# Patient Record
Sex: FEMALE | Employment: FULL TIME | URBAN - METROPOLITAN AREA
[De-identification: names, ages, dates, MRNs, and addresses within clinical notes are randomized per-mention and may not be internally consistent; named-entity substitution may affect disease eponyms.]

---

## 2017-03-14 ENCOUNTER — HOSPITAL ENCOUNTER (OUTPATIENT)
Dept: GENERAL RADIOLOGY | Age: 27
Discharge: HOME | End: 2017-03-14
Payer: COMMERCIAL

## 2017-03-14 DIAGNOSIS — Z22.7 INACTIVE TUBERCULOSIS: ICD-10-CM

## 2017-03-14 PROCEDURE — 71020 XR CHEST PA LAT: CPT

## 2018-01-02 ENCOUNTER — OFFICE VISIT (OUTPATIENT)
Dept: ORTHOPEDIC SURGERY | Facility: CLINIC | Age: 28
End: 2018-01-02

## 2018-01-02 VITALS
WEIGHT: 170 LBS | HEIGHT: 67 IN | SYSTOLIC BLOOD PRESSURE: 121 MMHG | BODY MASS INDEX: 26.68 KG/M2 | TEMPERATURE: 97.1 F | DIASTOLIC BLOOD PRESSURE: 80 MMHG | OXYGEN SATURATION: 98 % | HEART RATE: 83 BPM

## 2018-01-02 DIAGNOSIS — G89.29 CHRONIC PAIN OF RIGHT KNEE: Primary | ICD-10-CM

## 2018-01-02 DIAGNOSIS — M25.561 CHRONIC PAIN OF RIGHT KNEE: Primary | ICD-10-CM

## 2018-01-02 DIAGNOSIS — M94.261 CHONDROMALACIA, RIGHT KNEE: ICD-10-CM

## 2018-01-02 DIAGNOSIS — M25.361 KNEE INSTABILITY, RIGHT: ICD-10-CM

## 2018-01-02 NOTE — PROGRESS NOTES
Patient: Queen Perry                MRN: [id number]       SSN: [id number]  YOB: 1990        AGE: 32 y.o. SEX: female    PCP: None  01/02/18    Chief Complaint   Patient presents with    Knee Pain     RIGHT     HISTORY:  Queen Perry is a 32 y.o. female who is seen for right knee pain. She reports pain for the past year with no history of injury. She states she believes her pain is possibly a result of overuse when participating in high school sports. She states she was involved in basketball, softball, cheerleading and gymnastics. She notes popping and cracking or her right knee. She reports she received a cortisone injection from Dr. Leif Sullivan a couple of months ago with temporary relief. She states her pain increased most significantly after delivering her son 1 year ago. She notes lateral right knee pain that radiates into her upper right leg. She states that she feels like her right knee is unstable at times. She notes difficulty when planting her right foot and turning. 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 She is Dr. Garay Ast niece by marriage- Willie Ulloa is her father in-law. She lives in [place] with her  and two children. She has two children- a 1year old daughter and 3year old son. Current weight is 170 pounds. She reports she gained 25-30 pounds after having her son 1 year ago. She is 5'7\" tall. She is not diabetic or hypertensive.      No results found for: HBA1C, HGBE8, FAP3XBNO, FGD6GZBE, ZNQ3TUVH  Weight Metrics 1/2/2018 11/20/2016 11/1/2016 10/31/2016 10/9/2016 9/22/2016 7/15/2014   Weight 170 lb 177 lb 175 lb 175 lb 169 lb 167 lb 180 lb   BMI 26.63 kg/m2 27.72 kg/m2 27.41 kg/m2 27.41 kg/m2 26.47 kg/m2 26.16 kg/m2 28.19 kg/m2       Patient Active Problem List   Diagnosis Code    Tb converter tx'ed 2011 R76.11    Migraine Dr. Lopez Rear 0     premature rupture of membranes (PPROM) with unknown onset of labor O42.919     REVIEW OF SYSTEMS: All Below are Negative except: See HPI   Constitutional: negative for fever, chills, and weight loss. Cardiovascular: negative for chest pain, claudication, leg swelling, SOB, CHAPMAN   Gastrointestinal: Negative for pain, N/V/C/D, Blood in stool or urine, dysuria,  hematuria, incontinence, pelvic pain. Musculoskeletal: See HPI   Neurological: Negative for dizziness and weakness. Negative for headaches, Visual changes, confusion, seizures   Phychiatric/Behavioral: Negative for depression, memory loss, substance  abuse. Extremities: Negative for hair changes, rash, or skin lesion changes. Hematologic: Negative for bleeding problems, bruising, pallor or swollen lymph  nodes   Peripheral Vascular: No calf pain, no circulation deficits. Social History     Social History    Marital status:      Spouse name: N/A    Number of children: N/A    Years of education: N/A     Occupational History    Not on file. Social History Main Topics    Smoking status: Former Smoker     Packs/day: 0.50    Smokeless tobacco: Never Used    Alcohol use No    Drug use: No    Sexual activity: Yes     Partners: Male     Birth control/ protection: None     Other Topics Concern    Not on file     Social History Narrative      Allergies   Allergen Reactions    Topamax [Topiramate] Other (comments)     Emotional; pt states it makes her angry      Current Outpatient Prescriptions   Medication Sig    ibuprofen (MOTRIN) 800 mg tablet Take 1 Tab by mouth every eight (8) hours as needed.     ferrous sulfate 325 mg (65 mg iron) tablet Take 1 Tab by mouth two (2) times daily (with meals).  oxyCODONE-acetaminophen (PERCOCET) 5-325 mg per tablet Take 2 Tabs by mouth every four (4) hours as needed. Max Daily Amount: 12 Tabs. No current facility-administered medications for this visit. PHYSICAL EXAMINATION:  Visit Vitals    /80    Pulse 83    Temp 97.1 °F (36.2 °C) (Oral)    Ht 5' 7\" (1.702 m)    Wt 170 lb (77.1 kg)    SpO2 98%    BMI 26.63 kg/m2      ORTHO EXAMINATION:  Examination Right knee Left knee   Skin Intact Intact   Range of motion 115-0 120-0   Effusion - -   Medial joint line tenderness + -   Lateral joint line tenderness - -   Popliteal tenderness - -   Osteophytes palpable - -   Ashleys - -   Patella crepitus ++ -   Anterior drawer - -   Lateral laxity - -   Medial laxity - -   Varus deformity - -   Valgus deformity - -   Pretibial edema - -   Calf tenderness - -   Wearing a fit bit on her right wrist   - circumduction     RADIOGRAPHS:  XR RIGHT KNEE 1/2/18  IMPRESSION:  Three views - No fractures, no effusion, no joint space narrowing, no osteophytes present. Minimal flattening and squaring of the condylar surfaces. IMPRESSION:      ICD-10-CM ICD-9-CM    1. Chronic pain of right knee M25.561 719.46 AMB POC X-RAY KNEE 3 VIEW    G89.29 338.29 MRI KNEE RT WO CONT   2. Knee instability, right M25.361 718.86 MRI KNEE RT WO CONT   3. Chondromalacia, right knee M94.261 717.7      PLAN: She will follow up in two weeks with the results of her right knee MRI. There is no need for surgery at this time. Continue weight loss efforts with a low carb diet.     Scribed by Go Brown ([address]) as dictated by David Reynolds MD

## 2018-01-02 NOTE — PATIENT INSTRUCTIONS
MRI of the Knee: About This Test  What is it? MRI (magnetic resonance imaging) is a test that uses a magnetic field and pulses of radio wave energy to make pictures of the organs and structures inside the body. An MRI can give your doctor information about your knee, the bones around it, and the tissues around it, such as cartilage, ligaments, and tendons. When you have an MRI, you lie on a table and the table moves into the MRI machine. Why is this test done? An MRI of the knee can help find problems such as damage to the ligaments and cartilage around the knee. The MRI also can look for the cause of unexplained knee pain or the knee giving out for no reason. How can you prepare for the test?  Talk to your doctor about all your health conditions before the test. For example, tell your doctor if:  · You are allergic to any medicines. · You are or might be pregnant. · You have a pacemaker, an artificial limb, any metal pins or metal parts in your body, metal heart valves, metal clips in your brain, metal implants in your ears, or any other implanted or prosthetic medical device. · You have an intrauterine device (IUD) in place. · You get nervous in confined spaces. You may need medicine to help you relax. · You wear a patch that contains medicine. · You have kidney disease. What happens before the test?  · You will remove all metal objects, such as hearing aids, dentures, jewelry, watches, and hairpins. · You will take off all or most of your clothes and change into a gown. If you do leave some clothes on, make sure you take everything out of your pockets. · You may have contrast material (dye) put into your arm through a tube called an IV. Contrast material helps doctors see specific organs, blood vessels, and most tumors. What happens during the test?  · You will lie on your back on a table that is part of the MRI scanner.  Your head, chest, and arms may be held with straps to help you remain still.  · The table will slide into the space that contains the magnet. A device called a coil may be placed over or wrapped around your knee. · Inside the scanner you will hear a fan and feel air moving. You may hear tapping, thumping, or snapping noises. You may be given earplugs or headphones to reduce the noise. · You will be asked to hold still during the scan. You may be asked to hold your breath for short periods. · You may be alone in the scanning room, but a technologist will be watching you through a window and talking with you during the test.  What else should you know about the test?  · An MRI does not hurt. · If a dye is used, you may feel a quick sting or pinch and some coolness when the IV is started. The dye may give you a metallic taste in your mouth. Some people feel sick to their stomach or get a headache. · If you breastfeed and are concerned about whether the dye used in this test is safe, talk to your doctor. Most experts believe that very little dye passes into breast milk and even less is passed on to the baby. But if you prefer, you can store some of your breast milk ahead of time and use it for a day or two after the test.  · You may feel warmth in the area being examined. This is normal.  How long does the test take? · The test usually takes 30 to 60 minutes but can take as long as 2 hours. What happens after the test?  · You will probably be able to go home right away, depending on the reason for the test.  · You can go back to your usual activities right away. Follow-up care is a key part of your treatment and safety. Be sure to make and go to all appointments, and call your doctor if you are having problems. It's also a good idea to keep a list of the medicines you take. Ask your doctor when you can expect to have your test results. Where can you learn more? Go to http://foreign-jose.info/.   Enter  in the search box to learn more about \"MRI of the Knee: About This Test.\"  Current as of: October 14, 2016  Content Version: 11.4  © 2737-7337 Healthwise, Incorporated. Care instructions adapted under license by [employer] (which disclaims liability or warranty for this information). If you have questions about a medical condition or this instruction, always ask your healthcare professional. [place] [address] any warranty or liability for your use of this information.

## 2018-01-02 NOTE — MR AVS SNAPSHOT
Visit Information Date & Time Provider Department Dept. Phone Encounter #  
 1/2/2018  3:30 PM Raquel Salazar, [address] [facility] [phone] Follow-up Instructions Return in about 2 weeks (around 1/16/2018). Upcoming Health Maintenance Date Due DTaP/Tdap/Td series (1 - Tdap) 2/28/2011 PAP AKA CERVICAL CYTOLOGY 9/5/2015 Influenza Age 5 to Adult 8/1/2017 Allergies as of 1/2/2018  Review Complete On: 1/2/2018 By: Luci Saldaña Severity Noted Reaction Type Reactions Topamax [Topiramate]  09/01/2011    Other (comments) Emotional; pt states it makes her angry Current Immunizations  Never Reviewed No immunizations on file. Not reviewed this visit You Were Diagnosed With   
  
 Codes Comments Chronic pain of right knee    -  Primary ICD-10-CM: M25.561, T31.96 ICD-9-CM: 719.46, 338.29 Knee instability, right     ICD-10-CM: M25.361 ICD-9-CM: 718.86 Vitals BP Pulse Temp Height(growth percentile) Weight(growth percentile) SpO2  
 121/80 83 97.1 °F (36.2 °C) (Oral) 5' 7\" (1.702 m) 170 lb (77.1 kg) 98% BMI OB Status Smoking Status 26.63 kg/m2 Recent pregnancy Former Smoker BMI and BSA Data Body Mass Index Body Surface Area  
 26.63 kg/m 2 1.91 m 2 Preferred Pharmacy Pharmacy Name Phone [facility] [address] [phone] Your Updated Medication List  
  
   
This list is accurate as of: 1/2/18  4:37 PM.  Always use your most recent med list.  
  
  
  
  
 ferrous sulfate 325 mg (65 mg iron) tablet Take 1 Tab by mouth two (2) times daily (with meals). ibuprofen 800 mg tablet Commonly known as:  MOTRIN Take 1 Tab by mouth every eight (8) hours as needed. oxyCODONE-acetaminophen 5-325 mg per tablet Commonly known as:  PERCOCET  
 Take 2 Tabs by mouth every four (4) hours as needed. Max Daily Amount: 12 Tabs. We Performed the Following AMB POC X-RAY KNEE 3 VIEW [07236 CPT(R)] Follow-up Instructions Return in about 2 weeks (around 1/16/2018). Patient Instructions MRI of the Knee: About This Test 
What is it? MRI (magnetic resonance imaging) is a test that uses a magnetic field and pulses of radio wave energy to make pictures of the organs and structures inside the body. An MRI can give your doctor information about your knee, the bones around it, and the tissues around it, such as cartilage, ligaments, and tendons. When you have an MRI, you lie on a table and the table moves into the MRI machine. Why is this test done? An MRI of the knee can help find problems such as damage to the ligaments and cartilage around the knee. The MRI also can look for the cause of unexplained knee pain or the knee giving out for no reason. How can you prepare for the test? 
Talk to your doctor about all your health conditions before the test. For example, tell your doctor if: 
· You are allergic to any medicines. · You are or might be pregnant. · You have a pacemaker, an artificial limb, any metal pins or metal parts in your body, metal heart valves, metal clips in your brain, metal implants in your ears, or any other implanted or prosthetic medical device. · You have an intrauterine device (IUD) in place. · You get nervous in confined spaces. You may need medicine to help you relax. · You wear a patch that contains medicine. · You have kidney disease. What happens before the test? 
· You will remove all metal objects, such as hearing aids, dentures, jewelry, watches, and hairpins. · You will take off all or most of your clothes and change into a gown. If you do leave some clothes on, make sure you take everything out of your pockets.  
· You may have contrast material (dye) put into your arm through a tube called an IV. Contrast material helps doctors see specific organs, blood vessels, and most tumors. What happens during the test? 
· You will lie on your back on a table that is part of the MRI scanner. Your head, chest, and arms may be held with straps to help you remain still. · The table will slide into the space that contains the magnet. A device called a coil may be placed over or wrapped around your knee. · Inside the scanner you will hear a fan and feel air moving. You may hear tapping, thumping, or snapping noises. You may be given earplugs or headphones to reduce the noise. · You will be asked to hold still during the scan. You may be asked to hold your breath for short periods. · You may be alone in the scanning room, but a technologist will be watching you through a window and talking with you during the test. 
What else should you know about the test? 
· An MRI does not hurt. · If a dye is used, you may feel a quick sting or pinch and some coolness when the IV is started. The dye may give you a metallic taste in your mouth. Some people feel sick to their stomach or get a headache. · If you breastfeed and are concerned about whether the dye used in this test is safe, talk to your doctor. Most experts believe that very little dye passes into breast milk and even less is passed on to the baby. But if you prefer, you can store some of your breast milk ahead of time and use it for a day or two after the test. 
· You may feel warmth in the area being examined. This is normal. 
How long does the test take? · The test usually takes 30 to 60 minutes but can take as long as 2 hours. What happens after the test? 
· You will probably be able to go home right away, depending on the reason for the test. 
· You can go back to your usual activities right away. Follow-up care is a key part of your treatment and safety.  Be sure to make and go to all appointments, and call your doctor if you are having problems. It's also a good idea to keep a list of the medicines you take. Ask your doctor when you can expect to have your test results. Where can you learn more? Go to http://foreign-jose.info/. Enter  in the search box to learn more about \"MRI of the Knee: About This Test.\" Current as of: October 14, 2016 Content Version: 11.4 © 2357-1821 [employer]. Care instructions adapted under license by [employer] (which disclaims liability or warranty for this information). If you have questions about a medical condition or this instruction, always ask your healthcare professional. [address] any warranty or liability for your use of this information. Introducing Trevon! Marilee Toribio introduces RealDeck patient portal. Now you can access parts of your medical record, email your doctor's office, and request medication refills online. 1. In your internet browser, go to [email or web address] 2. Click on the First Time User? Click Here link in the Sign In box. You will see the New Member Sign Up page. 3. Enter your RealDeck Access Code exactly as it appears below. You will not need to use this code after youve completed the sign-up process. If you do not sign up before the expiration date, you must request a new code. · RealDeck Access Code: [email or web address] Expires: 4/2/2018  4:37 PM 
 
4. Enter the last four digits of your Social Security Number (xxxx) and Date of Birth (mm/dd/yyyy) as indicated and click Submit. You will be taken to the next sign-up page. 5. Create a RealDeck ID. This will be your RealDeck login ID and cannot be changed, so think of one that is secure and easy to remember. 6. Create a RealDeck password. You can change your password at any time. 7. Enter your Password Reset Question and Answer. This can be used at a later time if you forget your password. 8. Enter your e-mail address. You will receive e-mail notification when new information is available in [address] 9. Click Sign Up. You can now view and download portions of your medical record. 10. Click the Download Summary menu link to download a portable copy of your medical information. If you have questions, please visit the Frequently Asked Questions section of the [employer] website. Remember, [employer] is NOT to be used for urgent needs. For medical emergencies, dial 911. Now available from your iPhone and Android! Please provide this summary of care documentation to your next provider. Your primary care clinician is listed as NONE. If you have any questions after today's visit, please call [phone].

## 2018-01-10 ENCOUNTER — HOSPITAL ENCOUNTER (OUTPATIENT)
Dept: MRI IMAGING | Age: 28
Discharge: HOME | End: 2018-01-10
Attending: SPECIALIST
Payer: COMMERCIAL

## 2018-01-10 DIAGNOSIS — M25.561 CHRONIC PAIN OF RIGHT KNEE: ICD-10-CM

## 2018-01-10 DIAGNOSIS — M25.361 KNEE INSTABILITY, RIGHT: ICD-10-CM

## 2018-01-10 DIAGNOSIS — G89.29 CHRONIC PAIN OF RIGHT KNEE: ICD-10-CM

## 2018-01-10 PROCEDURE — 73721 MRI JNT OF LWR EXTRE W/O DYE: CPT

## 2018-01-25 ENCOUNTER — OFFICE VISIT (OUTPATIENT)
Dept: ORTHOPEDIC SURGERY | Facility: CLINIC | Age: 28
End: 2018-01-25

## 2018-01-25 VITALS
SYSTOLIC BLOOD PRESSURE: 119 MMHG | WEIGHT: 181 LBS | HEART RATE: 89 BPM | OXYGEN SATURATION: 100 % | TEMPERATURE: 96.9 F | DIASTOLIC BLOOD PRESSURE: 74 MMHG | RESPIRATION RATE: 18 BRPM | BODY MASS INDEX: 28.41 KG/M2 | HEIGHT: 67 IN

## 2018-01-25 DIAGNOSIS — M25.361 KNEE INSTABILITY, RIGHT: ICD-10-CM

## 2018-01-25 DIAGNOSIS — G89.29 CHRONIC PAIN OF RIGHT KNEE: ICD-10-CM

## 2018-01-25 DIAGNOSIS — M25.561 CHRONIC PAIN OF RIGHT KNEE: ICD-10-CM

## 2018-01-25 DIAGNOSIS — M94.261 CHONDROMALACIA, RIGHT KNEE: Primary | ICD-10-CM

## 2018-01-25 NOTE — PROGRESS NOTES
Patient: Nancy Pope                MRN: [id number]       SSN: [id number]  YOB: 1990        AGE: 32 y.o. SEX: female    PCP: None  01/25/18    Chief Complaint   Patient presents with    Knee Pain     Right     HISTORY:  Nancy Pope is a 32 y.o. female who is seen for continued right knee pain. She reports anterior knee pain for the past year with no history of injury. She states she believes her pain is possibly a result of overuse when participating in high school sports. She states she was involved in basketball, softball, cheerleading and gymnastics. She notes popping and cracking or her right knee. She reports she received a cortisone injection from Dr. Bubba Bowen a couple of months ago with temporary relief. She states her pain increased most significantly after delivering her son 1 year ago. She notes lateral right knee pain that radiates into her upper right leg. She states that she feels like her right knee is unstable at times. She notes difficulty when planting her right foot and turning. 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 She is Dr. Rachael Roa niece by marriage- Chandler Bach is her father in-law. She lives in [place] with her  and two children. She has two children- a 1year old daughter and 3year old son. Current weight is 181 pounds. She reports she gained 25-30 pounds after having her son 1 year ago. She is 5'7\" tall. She is not diabetic or hypertensive.      No results found for: HBA1C, HGBE8, IIT3TTTA, UEK1HYXX, SWH6LVSG  Weight Metrics 1/25/2018 1/2/2018 11/20/2016 2016 10/31/2016 10/9/2016 2016   Weight 181 lb 170 lb 177 lb 175 lb 175 lb 169 lb 167 lb   BMI 28.35 kg/m2 26.63 kg/m2 27.72 kg/m2 27.41 kg/m2 27.41 kg/m2 26.47 kg/m2 26.16 kg/m2       Patient Active Problem List   Diagnosis Code    Tb converter tx'ed  R76.11    Migraine Dr. Lloyd Cruz 0     premature rupture of membranes (PPROM) with unknown onset of labor O42.919     REVIEW OF SYSTEMS: All Below are Negative except: See HPI   Constitutional: negative for fever, chills, and weight loss. Cardiovascular: negative for chest pain, claudication, leg swelling, SOB, CHAPMAN   Gastrointestinal: Negative for pain, N/V/C/D, Blood in stool or urine, dysuria,  hematuria, incontinence, pelvic pain. Musculoskeletal: See HPI   Neurological: Negative for dizziness and weakness. Negative for headaches, Visual changes, confusion, seizures   Phychiatric/Behavioral: Negative for depression, memory loss, substance  abuse. Extremities: Negative for hair changes, rash, or skin lesion changes. Hematologic: Negative for bleeding problems, bruising, pallor or swollen lymph  nodes   Peripheral Vascular: No calf pain, no circulation deficits. Social History     Social History    Marital status:      Spouse name: N/A    Number of children: N/A    Years of education: N/A     Occupational History    Not on file. Social History Main Topics    Smoking status: Former Smoker     Packs/day: 0.50    Smokeless tobacco: Never Used    Alcohol use No    Drug use: No    Sexual activity: Yes     Partners: Male     Birth control/ protection: None     Other Topics Concern    Not on file     Social History Narrative      Allergies   Allergen Reactions    Topamax [Topiramate] Other (comments)     Emotional; pt states it makes her angry      Current Outpatient Prescriptions   Medication Sig    ibuprofen (MOTRIN) 800 mg tablet Take 1 Tab by mouth every eight (8) hours as needed.     ferrous sulfate 325 mg (65 mg iron) tablet Take 1 Tab by mouth two (2) times daily (with meals).  oxyCODONE-acetaminophen (PERCOCET) 5-325 mg per tablet Take 2 Tabs by mouth every four (4) hours as needed. Max Daily Amount: 12 Tabs. No current facility-administered medications for this visit. PHYSICAL EXAMINATION:  Visit Vitals    /74    Pulse 89    Temp 96.9 °F (36.1 °C) (Oral)    Resp 18    Ht 5' 7\" (1.702 m)    Wt 181 lb (82.1 kg)    SpO2 100%    BMI 28.35 kg/m2      ORTHO EXAMINATION:  Examination Right knee Left knee   Skin Intact Intact   Range of motion 115-0 120-0   Effusion - -   Medial joint line tenderness + -   Lateral joint line tenderness - -   Popliteal tenderness - -   Osteophytes palpable + -   Ashleys - -   Patella crepitus ++ -   Anterior drawer - -   Lateral laxity - -   Medial laxity - -   Varus deformity - -   Valgus deformity - -   Pretibial edema - -   Calf tenderness - -   Wearing a fit bit on her right wrist   - circumduction     MRI RIGHT KNEE WO CONT 1/10/18  IMPRESSION:   1. Menisci and ligaments are intact. 2. Mild distal quadriceps tendinosis. 3. Probable area of subchondral cystic change with overlying chondromalacia in the lower pole of the patella. RADIOGRAPHS:  XR RIGHT KNEE 1/2/18  IMPRESSION:  Three views - No fractures, no effusion, no joint space narrowing, no osteophytes present. Minimal flattening and squaring of the condylar surfaces. IMPRESSION:      ICD-10-CM ICD-9-CM    1. Chondromalacia, right knee M94.261 717.7 REFERRAL TO PHYSICAL THERAPY      PROCEDURE AUTHORIZATION TO    2. Knee instability, right M25.361 718.86 REFERRAL TO PHYSICAL THERAPY      PROCEDURE AUTHORIZATION TO    3. Chronic pain of right knee M25.561 719.46 REFERRAL TO PHYSICAL THERAPY    G89.29 338.29 PROCEDURE AUTHORIZATION TO      PLAN: She will follow up as needed. Consider visco supplementation if pain continues. There is no need for surgery at this time.  She will start a brief course of outpatient physical therapy to her right knee.     Scribed by Treasure Waddell ([address]) as dictated by Evans Li MD

## 2018-01-25 NOTE — PATIENT INSTRUCTIONS
Patellofemoral Pain Syndrome (Runner's Knee): Exercises  Your Care Instructions  Here are some examples of typical rehabilitation exercises for your condition. Start each exercise slowly. Ease off the exercise if you start to have pain. Your doctor or physical therapist will tell you when you can start these exercises and which ones will work best for you. How to do the exercises  Calf wall stretch    1. Stand facing a wall with your hands on the wall at about eye level. Put your affected leg about a step behind your other leg. 2. Keeping your back leg straight and your back heel on the floor, bend your front knee and gently bring your hip and chest toward the wall until you feel a stretch in the calf of your back leg. 3. Hold the stretch for at least 15 to 30 seconds. 4. Repeat 2 to 4 times. 5. Repeat steps 1 through 4, but this time keep your back knee bent. Quadriceps stretch    1. If you are not steady on your feet, hold on to a chair, counter, or wall. 2. Bend your affected leg, and reach behind you to grab the front of your foot or ankle with the hand on the same side. For example, if you are stretching your right leg, use your right hand. 3. Keeping your knees next to each other, pull your foot toward your buttock until you feel a gentle stretch across the front of your hip and down the front of your thigh. Your knee should be pointed directly to the ground, and not out to the side. 4. Hold the stretch for at least 15 to 30 seconds. 5. Repeat 2 to 4 times. Hamstring wall stretch    1. Lie on your back in a doorway, with your good leg through the open door. 2. Slide your affected leg up the wall to straighten your knee. You should feel a gentle stretch down the back of your leg. 1. Do not arch your back. 2. Do not bend either knee. 3. Keep one heel touching the floor and the other heel touching the wall. Do not point your toes. 3. Hold the stretch for at least 1 minute.  Then over time, try to lengthen the time you hold the stretch to as long as 6 minutes. 4. Repeat 2 to 4 times. 5. If you do not have a place to do this exercise in a doorway, there is another way to do it:  6. Lie on your back, and bend your affected leg. 7. Loop a towel under the ball and toes of that foot, and hold the ends of the towel in your hands. 8. Straighten your knee, and slowly pull back on the towel. You should feel a gentle stretch down the back of your leg. 9. Hold the stretch for at least 15 to 30 seconds. Or even better, hold the stretch for 1 minute if you can. 10. Repeat 2 to 4 times. Quad sets    1. Sit with your affected leg straight and supported on the floor or a firm bed. Place a small, rolled-up towel under your affected knee. Your other leg should be bent, with that foot flat on the floor. 2. Tighten the thigh muscles of your affected leg by pressing the back of your knee down into the towel. 3. Hold for about 6 seconds, then rest for up to 10 seconds. 4. Repeat 8 to 12 times. Straight-leg raises to the front    1. Lie on your back with your good knee bent so that your foot rests flat on the floor. Your affected leg should be straight. Make sure that your low back has a normal curve. You should be able to slip your hand in between the floor and the small of your back, with your palm touching the floor and your back touching the back of your hand. 2. Tighten the thigh muscles in your affected leg by pressing the back of your knee flat down to the floor. Hold your knee straight. 3. Keeping the thigh muscles tight and your leg straight, lift your affected leg up so that your heel is about 12 inches off the floor. 4. Hold for about 6 seconds, then lower your leg slowly. Rest for up to 10 seconds between repetitions. 5. Repeat 8 to 12 times. Straight-leg raises to the back    1. Lie on your stomach, and lift your leg straight up behind you (toward the ceiling).   2. Lift your toes about 6 inches off the floor, hold for about 6 seconds, then lower slowly. 3. Do 8 to 12 repetitions. Wall slide with ball squeeze    1. Stand with your back against a wall and with your feet about shoulder-width apart. Your feet should be about 12 inches away from the wall. 2. Put a ball about the size of a soccer ball between your knees. Then slowly slide down the wall until your knees are bent about 20 to 30 degrees. 3. Tighten your thigh muscles by squeezing the ball between your knees. Hold that position for about 10 seconds, then stop squeezing. Rest for up to 10 seconds between repetitions. 4. Repeat 8 to 12 times. Follow-up care is a key part of your treatment and safety. Be sure to make and go to all appointments, and call your doctor if you are having problems. It's also a good idea to know your test results and keep a list of the medicines you take. Where can you learn more? Go to http://foreign-jose.info/. Enter A404 in the search box to learn more about \"Patellofemoral Pain Syndrome (Runner's Knee): Exercises. \"  Current as of: March 21, 2017  Content Version: 11.4  © 1226-0044 [employer]. Care instructions adapted under license by [employer] (which disclaims liability or warranty for this information). If you have questions about a medical condition or this instruction, always ask your healthcare professional. [place] [address] any warranty or liability for your use of this information.

## 2018-01-31 ENCOUNTER — HOSPITAL ENCOUNTER (OUTPATIENT)
Dept: PHYSICAL THERAPY | Age: 28
Discharge: HOME | End: 2018-01-31
Payer: COMMERCIAL

## 2018-01-31 PROCEDURE — 97110 THERAPEUTIC EXERCISES: CPT

## 2018-01-31 PROCEDURE — 97162 PT EVAL MOD COMPLEX 30 MIN: CPT

## 2018-01-31 NOTE — PROGRESS NOTES
PT DAILY TREATMENT NOTE - [facility]     Patient Name: Ashley Leon  Date:2018  : 1990  [x]  Patient  Verified  Payor: BHARAT Seneca / Plan: [address] [place] [address] / Product Type: PPO /    In time:5:05  Out time:5:37  Total Treatment Time (min): 32  Visit #: 1 of 10    Treatment Area: Pain in right knee [M25.561]  Chondromalacia, right knee [M94.261]  Other instability, right knee [M25.361]    SUBJECTIVE  Pain Level (0-10 scale): 3  Any medication changes, allergies to medications, adverse drug reactions, diagnosis change, or new procedure performed?: [x] No    [] Yes (see summary sheet for update)  Subjective functional status/changes:   [] No changes reported  See POC    OBJECTIVE    24 min [x]Eval                  []Re-Eval     8 min Therapeutic Exercise:  [] See flow sheet : HEP instruction and demonstration, pt education regarding anatomy and physiology of the LEs and how it relates to the pt's condition. Rationale: increase ROM and increase strength to improve the patients ability to tolerate ADLs          With   [] TE   [] TA   [] neuro   [] other: Patient Education: [x] Review HEP    [] Progressed/Changed HEP based on:   [] positioning   [] body mechanics   [] transfers   [] heat/ice application    [] other:      Other Objective/Functional Measures: See evaluation. Pain Level (0-10 scale) post treatment: 0    ASSESSMENT/Changes in Function: Pt given HEP handout to perform. Pt understood exercises in HEP handout. Pt demonstrated decreased B hip AROM, decreased strength, impaired squat form. Hyperextension noted on both knees. Negative valgus/varus and 0 degs/30 degs, anterior/posterior drawer and Ashley's tests are noted of the right knee. Pt would benefit from physical therapy to improve the above impairments to help the pt return to performing ADLs, functional and recreational activities.      Patient will continue to benefit from skilled PT services to modify and progress therapeutic interventions, address functional mobility deficits, address ROM deficits, address strength deficits, analyze and address soft tissue restrictions, analyze and cue movement patterns, analyze and modify body mechanics/ergonomics, address imbalance/dizziness and instruct in home and community integration to attain remaining goals. [x]  See Plan of Care  []  See progress note/recertification  []  See Discharge Summary         Progress towards goals / Updated goals:  Short Term Goals: To be accomplished in 2 weeks:  1. Pt will report compliance and independence to [facility] to help the pt manage their pain and symptoms. Eval: established            Long Term Goals: To be accomplished in 5 weeks:  1. Pt will increase FOTO score to 70 points to improve ability to perform ADLs. Eval: 51 points  2. Pt will increase MMT right hip flex to 4+/5, ER/IR to 4/5, B hip EXT to 4-/5 to improve ability to tolerate prolonged walking. Eval: right hip flex 4/5, IR 3+/5, ER 4-/5, B hip EXT 3+/5  3. Pt will report a decrease in at worst pain level to 4/10 in the right knee with daily activities to improve pt's tolerance to  tasks. Eval: 6/10 at worst  4. Pt will report being able to ascend/descend a flight of stairs with minimal to no instability in the right knee to improve pt's confidence in her mobility at home. Eval: reports instability in her right knee when ascending/dewsending stairs at home.      PLAN  [x]  Upgrade activities as tolerated     [x]  Continue plan of care  [x]  Update interventions per flow sheet       []  Discharge due to:_  []  Other:_      Neil Atkins PT 1/31/2018  5:54 PM    Future Appointments  Date Time Provider Melecio Arroyo   1/31/2018 5:00 PM Neil Atkins PT [facility]

## 2018-01-31 NOTE — PROGRESS NOTES
[facility] [address] [facility] [address], [address] [phone] fax    Plan of Care/ Statement of Necessity for Physical Therapy Services  Patient name: Sin Coelho Start of Care: 2018   Referral source: Tamara Machado MD : 1990    Medical Diagnosis: Pain in right knee [M25.561]  Chondromalacia, right knee [M94.261]  Other instability, right knee [M25.361]   Onset Date: initial ~10-15 years ago, most recent roxanna the past year    Treatment Diagnosis: right knee pain and instability   Prior Hospitalization: see medical history Provider#: [id number]   Medications: Verified on Patient summary List    Comorbidities: had positive PPD in ~ but is negative currently for TB, hx pregnancies   Prior Level of Function: Independent with ADLs, functional, and work tasks with intermittent pain in the right knee     The Plan of Care and following information is based on the information from the initial evaluation. Assessment/ key information:   Pt is a 32year old female who presents to therapy today with right knee pain. Pt states that her initial symptoms began when she was in high school from playing multiple sports (denies specific injury or trauma). Pt states worsening of her symptoms over the past year after the birth of her son. Pt reports having limited strength and stability in the right knee, crepitus, difficulty with stairs at times, and increased pain after prolonged use of the right knee. Pt states that she can have pain in the lateral knee at times as well. MRI dated 1/10/2018 states: \"Menisci and ligaments are intact. Mild distal quadriceps tendinosis. Probable area of subchondral cystic change with overlying chondromalacia in the lower pole of the patella\". Pt demonstrated decreased B hip AROM, decreased strength, impaired squat form. Hyperextension noted on both knees.  Negative valgus/varus and 0 degs/30 degs, anterior/posterior drawer and Ashley's tests are noted of the right knee. Pt would benefit from physical therapy to improve the above impairments to help the pt return to performing ADLs, functional and recreational activities. Evaluation Complexity History MEDIUM  Complexity : 1-2 comorbidities / personal factors will impact the outcome/ POC ; Examination HIGH Complexity : 4+ Standardized tests and measures addressing body structure, function, activity limitation and / or participation in recreation  ;Presentation LOW Complexity : Stable, uncomplicated  ;Clinical Decision Making MEDIUM Complexity : FOTO score of 26-74  Overall Complexity Rating: MEDIUM  Problem List: pain affecting function, decrease ROM, decrease strength, impaired gait/ balance, decrease ADL/ functional abilitiies, decrease activity tolerance, decrease flexibility/ joint mobility and decrease transfer abilities   Treatment Plan may include any combination of the following: Therapeutic exercise, Therapeutic activities, Neuromuscular re-education, Physical agent/modality, Gait/balance training, Manual therapy, Patient education, Self Care training, Functional mobility training, Home safety training and Stair training  Patient / Family readiness to learn indicated by: asking questions, trying to perform skills and interest  Persons(s) to be included in education: patient (P)  Barriers to Learning/Limitations: None  Patient Goal (s): strength/stability  Patient Self Reported Health Status: good  Rehabilitation Potential: good    Short Term Goals: To be accomplished in 2 weeks:  1. Pt will report compliance and independence to [facility] to help the pt manage their pain and symptoms. Long Term Goals: To be accomplished in 5 weeks:  1. Pt will increase FOTO score to 70 points to improve ability to perform ADLs. 2. Pt will increase MMT right hip flex to 4+/5, ER/IR to 4/5, B hip EXT to 4-/5 to improve ability to tolerate prolonged walking.   3. Pt will report a decrease in at worst pain level to 4/10 in the right knee with daily activities to improve pt's tolerance to  tasks. 4. Pt will report being able to ascend/descend a flight of stairs with minimal to no instability in the right knee to improve pt's confidence in her mobility at home. Frequency / Duration: Patient to be seen 2 times per week for 5 weeks. Patient/ Caregiver education and instruction: Diagnosis, prognosis, self care, activity modification and exercises   [x]  Plan of care has been reviewed with VALERIO Hedrick, PT 1/31/2018 5:43 PM  _____________________________________________________________________  I certify that the above Therapy Services are being furnished while the patient is under my care. I agree with the treatment plan and certify that this therapy is necessary.     Physician's Signature:____________________  Date:__________Time:______    Please sign and return to [facility] [address]  [address], [address] [phone] fax

## 2018-02-06 ENCOUNTER — HOSPITAL ENCOUNTER (OUTPATIENT)
Dept: PHYSICAL THERAPY | Age: 28
Discharge: HOME | End: 2018-02-06
Payer: COMMERCIAL

## 2018-02-06 PROCEDURE — 97110 THERAPEUTIC EXERCISES: CPT

## 2018-02-06 PROCEDURE — 97112 NEUROMUSCULAR REEDUCATION: CPT

## 2018-02-06 NOTE — PROGRESS NOTES
PT DAILY TREATMENT NOTE 3-16    Patient Name: Britta Mathis  Date:2018  : 1990  [x]  Patient  Verified  Payor: BHARAT GIANCARLO / Plan: [address] [place] [address] / Product Type: PPO /    In time:5:00  Out time:5:32  Total Treatment Time (min): 32  Visit #: 2 of 10    Treatment Area: Pain in right knee [M25.561]  Chondromalacia, right knee [M94.261]  Other instability, right knee [M25.361]    SUBJECTIVE  Pain Level (0-10 scale): 0  Any medication changes, allergies to medications, adverse drug reactions, diagnosis change, or new procedure performed?: [x] No    [] Yes (see summary sheet for update)  Subjective functional status/changes:   [] No changes reported  \"I almost cancelled. I have had sick kids all weekend. \"    OBJECTIVE  Modality rationale: PD   Min Type Additional Details    [] Estim:  []Unatt       []IFC  []Premod                        []Other:  []w/ice   []w/heat  Position:  Location:    [] Estim: []Att    []TENS instruct  []NMES                    []Other:  []w/US   []w/ice   []w/heat  Position:  Location:    []  Traction: [] Cervical       []Lumbar                       [] Prone          []Supine                       []Intermittent   []Continuous Lbs:  [] before manual  [] after manual    []  Ultrasound: []Continuous   [] Pulsed                           []1MHz   []3MHz Location:  W/cm2:    []  Iontophoresis with dexamethasone         Location: [] Take home patch   [] In clinic    []  Ice     []  heat  []  Ice massage  []  Laser   []  Anodyne Position:  Location:    []  Laser with stim  []  Other: Position:  Location:    []  Vasopneumatic Device Pressure:       [] lo [] med [] hi   Temperature: [] lo [] med [] hi   [] Skin assessment post-treatment:  []intact []redness- no adverse reaction    []redness  adverse reaction:     24 min Therapeutic Exercise:  [x] See flow sheet :   Rationale: increase ROM, increase strength and improve coordination to improve the patients ability to increase ease with ADLs    8 min Neuromuscular Re-education:  [x]  See flow sheet :   Rationale: increase strength, improve coordination, improve balance and increase proprioception  to improve the patients ability to improve knee stability with ADLs            With   [] TE   [] TA   [] neuro   [] other: Patient Education: [x] Review HEP    [] Progressed/Changed HEP based on:   [] positioning   [] body mechanics   [] transfers   [] heat/ice application    [] other:      Other Objective/Functional Measures:   First follow up session---cues sequencing and correct form throughout       Pain Level (0-10 scale) post treatment: 1-2/10    ASSESSMENT/Changes in Function:   Initiated POC per flowsheet. Patient puts forth good effort with exercises. Audible bilateral knee crepitus noted during dynamic step ups. Patient will continue to benefit from skilled PT services to modify and progress therapeutic interventions, address functional mobility deficits, address ROM deficits, address strength deficits, analyze and address soft tissue restrictions, analyze and cue movement patterns, analyze and modify body mechanics/ergonomics and assess and modify postural abnormalities to attain remaining goals. []  See Plan of Care  []  See progress note/recertification  []  See Discharge Summary            Short Term Goals: To be accomplished in 2 weeks:  1. Pt will report compliance and independence to HEP to help the pt manage their pain and symptoms. not met per patient report due to her kids being sick. Therapist encourages patient to perform HEP when able (2/6/2018)             Long Term Goals: To be accomplished in 5 weeks:  1. Pt will increase FOTO score to 70 points to improve ability to perform ADLs. 2. Pt will increase MMT right hip flex to 4+/5, ER/IR to 4/5, B hip EXT to 4-/5 to improve ability to tolerate prolonged walking.   3. Pt will report a decrease in at worst pain level to 4/10 in the right knee with daily activities to improve pt's tolerance to  tasks. 4. Pt will report being able to ascend/descend a flight of stairs with minimal to no instability in the right knee to improve pt's confidence in her mobility at home.     PLAN  []  Upgrade activities as tolerated     [x]  Continue plan of care  []  Update interventions per flow sheet       []  Discharge due to:_  []  Other:_      Adelaida Horton 2/6/2018  5:04 PM    Future Appointments  Date Time Provider Melecio Arroyo   2/8/2018 5:00 PM [facility]   2/14/2018 5:00 PM Jad Mo, [facility]   2/16/2018 5:00 PM [facility]

## 2018-02-08 ENCOUNTER — APPOINTMENT (OUTPATIENT)
Dept: PHYSICAL THERAPY | Age: 28
End: 2018-02-08
Payer: COMMERCIAL

## 2018-02-14 ENCOUNTER — HOSPITAL ENCOUNTER (OUTPATIENT)
Dept: PHYSICAL THERAPY | Age: 28
Discharge: HOME | End: 2018-02-14
Payer: COMMERCIAL

## 2018-02-14 PROCEDURE — 97110 THERAPEUTIC EXERCISES: CPT

## 2018-02-14 PROCEDURE — 97112 NEUROMUSCULAR REEDUCATION: CPT

## 2018-02-16 ENCOUNTER — HOSPITAL ENCOUNTER (OUTPATIENT)
Dept: PHYSICAL THERAPY | Age: 28
Discharge: HOME | End: 2018-02-16
Payer: COMMERCIAL

## 2018-02-16 PROCEDURE — 97112 NEUROMUSCULAR REEDUCATION: CPT

## 2018-02-16 PROCEDURE — 97110 THERAPEUTIC EXERCISES: CPT

## 2018-02-16 NOTE — PROGRESS NOTES
PT DAILY TREATMENT NOTE     Patient Name: Robe Duong  Date:2018  : 1990  [x]  Patient  Verified  Payor: Alton Maher / Plan: [address] [place] [address] / Product Type: PPO /    In time:458  Out time:535  Total Treatment Time (min): 37  Visit #: 4 of 10    Treatment Area: Pain in right knee [M25.561]  Chondromalacia, right knee [M94.261]  Other instability, right knee [M25.361]    SUBJECTIVE  Pain Level (0-10 scale): 1  Any medication changes, allergies to medications, adverse drug reactions, diagnosis change, or new procedure performed?: [x] No    [] Yes (see summary sheet for update)  Subjective functional status/changes:   [] No changes reported  \"I just have an ache. \"    OBJECTIVE    Modality rationale: patient declined   Min Type Additional Details    [] Estim:  []Unatt       []IFC  []Premod                        []Other:  []w/ice   []w/heat  Position:  Location:    [] Estim: []Att    []TENS instruct  []NMES                    []Other:  []w/US   []w/ice   []w/heat  Position:  Location:    []  Traction: [] Cervical       []Lumbar                       [] Prone          []Supine                       []Intermittent   []Continuous Lbs:  [] before manual  [] after manual    []  Ultrasound: []Continuous   [] Pulsed                           []1MHz   []3MHz W/cm2:  Location:    []  Iontophoresis with dexamethasone         Location: [] Take home patch   [] In clinic    []  Ice     []  heat  []  Ice massage  []  Laser   []  Anodyne Position:  Location:    []  Laser with stim  []  Other:  Position:  Location:    []  Vasopneumatic Device Pressure:       [] lo [] med [] hi   Temperature: [] lo [] med [] hi   [] Skin assessment post-treatment:  []intact []redness- no adverse reaction    []redness  adverse reaction:     10 min Therapeutic Exercise:  [x] See flow sheet :   Rationale: increase ROM and increase strength to improve the patients ability to perform ADLs    27 min Neuromuscular Re-education:  [x]  See flow sheet : quad re-ed activities, Aceves's taping   Rationale: increase ROM, increase strength, improve coordination, improve balance and increase proprioception  to improve the patients ability to improve mobility, stance stability, and gait        With   [x] TE   [] TA   [x] neuro   [] other: Patient Education: [x] Review HEP    [] Progressed/Changed HEP based on:   [x] positioning   [x] body mechanics   [] transfers   [] heat/ice application    [] other:      Other Objective/Functional Measures:      Pain Level (0-10 scale) post treatment: 0    ASSESSMENT/Changes in Function: Pt was challenged and fatigued by the end of her treatment. She demonstrated good squatting mechanics, but had some crepitus past 90 deg. Attempted Aceves's taping to her R knee. Patient will continue to benefit from skilled PT services to modify and progress therapeutic interventions, address functional mobility deficits, address ROM deficits, address strength deficits, analyze and address soft tissue restrictions, analyze and cue movement patterns, analyze and modify body mechanics/ergonomics, assess and modify postural abnormalities, address imbalance/dizziness and instruct in home and community integration to attain remaining goals. [x]  See Plan of Care  []  See progress note/recertification  []  See Discharge Summary         Progress towards goals / Updated goals:  Short Term Goals: To be accomplished in 2 weeks:  1. Pt will report compliance and independence to HEP to help the pt manage their pain and symptoms.    not met per patient report due to her kids being sick. Therapist encourages patient to perform HEP when able (2/6/2018)             [address] be accomplished in 5 weeks:  1. Pt will increase FOTO score to 70 points to improve ability to perform ADLs. Assess at [place]  2.  Pt will increase MMT right hip flex to 4+/5, ER/IR to 4/5, B hip EXT to 4-/5 to improve ability to tolerate prolonged walking. Assess at later visit  3. Pt will report a decrease in at worst pain level to 4/10 in the right knee with daily activities to improve pt's tolerance to  tasks. Pain 0/10 recently, but with feelings of instability  4. Pt will report being able to ascend/descend a flight of stairs with minimal to no instability in the right knee to improve pt's confidence in her mobility at home.    No change to note    PLAN  []  Upgrade activities as tolerated     [x]  Continue plan of care  []  Update interventions per flow sheet       []  Discharge due to:_  []  Other:_      Jos Julien PTA, CSCS 2/16/2018  5:41 PM    Future Appointments  Date Time Provider Melecio Dina   2/16/2018 5:00 PM [facility]   2/20/2018 5:00 PM [address] [facility] [place] [facility]   2/22/2018 5:00 PM Evelyn Disla, PT [facility]   2/27/2018 5:00 PM Irish Mendoza, PT [facility]   3/1/2018 5:00 PM [facility]

## 2018-02-20 ENCOUNTER — APPOINTMENT (OUTPATIENT)
Dept: PHYSICAL THERAPY | Age: 28
End: 2018-02-20
Payer: COMMERCIAL

## 2018-02-22 ENCOUNTER — HOSPITAL ENCOUNTER (OUTPATIENT)
Dept: PHYSICAL THERAPY | Age: 28
Discharge: HOME | End: 2018-02-22
Payer: COMMERCIAL

## 2018-02-22 PROCEDURE — 97112 NEUROMUSCULAR REEDUCATION: CPT

## 2018-02-22 PROCEDURE — 97110 THERAPEUTIC EXERCISES: CPT

## 2018-02-22 NOTE — PROGRESS NOTES
PT DAILY TREATMENT NOTE     Patient Name: Lj Muir  Date:2018  : 1990  [x]  Patient  Verified  Payor: BHARAT GIANCARLO / Plan: [address] [place] [address] / Product Type: PPO /    In time: 5:13  Out time: 5:50  Total Treatment Time (min): 37  Visit #: 5 of 10    Treatment Area: Pain in right knee [M25.561]  Chondromalacia, right knee [M94.261]  Other instability, right knee [M25.361]    SUBJECTIVE  Pain Level (0-10 scale): 0  Any medication changes, allergies to medications, adverse drug reactions, diagnosis change, or new procedure performed?: [x] No    [] Yes (see summary sheet for update)  Subjective functional status/changes:   [] No changes reported  No changes reported today. OBJECTIVE    10 min Therapeutic Exercise:  [x] See flow sheet :   Rationale: increase ROM and increase strength to improve the patients ability to perform ADLs    27 min Neuromuscular Re-education:  [x]  See flow sheet : quad re-ed activities, BOSU and SB exercises. Rationale: increase ROM, increase strength, improve coordination, improve balance and increase proprioception  to improve the patients ability to improve ease of mobility        With   [x] TE   [] TA   [x] neuro   [] other: Patient Education: [x] Review HEP    [] Progressed/Changed HEP based on:   [x] positioning   [x] body mechanics   [] transfers   [] heat/ice application    [] other:      Other Objective/Functional Measures: Challenged with stability during bridge with knee flex on SB exercise. FOTO: 64 points. Pain Level (0-10 scale) post treatment: 0    ASSESSMENT/Changes in Function: Pt reports that she has seen minimal improvements in overall right knee symptoms since starting therapy (nevertheless, the pt's FOTO score increased 13 points today since the evaluation). Added BOSU lunges and SB bridges with knees flexed to improve stability in the LEs. Will progress pt as tolerated and monitor her response to therapy interventions. Continue POC as tolerated. Patient will continue to benefit from skilled PT services to modify and progress therapeutic interventions, address functional mobility deficits, address ROM deficits, address strength deficits, analyze and address soft tissue restrictions, analyze and cue movement patterns, analyze and modify body mechanics/ergonomics, assess and modify postural abnormalities, address imbalance/dizziness and instruct in home and community integration to attain remaining goals. []  See Plan of Care  []  See progress note/recertification  []  See Discharge Summary         Progress towards goals / Updated goals:  Short Term Goals: To be accomplished in 2 weeks:  1. Pt will report compliance and independence to HEP to help the pt manage their pain and symptoms.    not met per patient report due to her kids being sick. Therapist encourages patient to perform HEP when able (2/6/2018)             [address] be accomplished in 5 weeks:  1. Pt will increase FOTO score to 70 points to improve ability to perform ADLs. Progressing. 64 points 2/22/2018  2. Pt will increase MMT right hip flex to 4+/5, ER/IR to 4/5, B hip EXT to 4-/5 to improve ability to tolerate prolonged walking. Assess at later visit  3. Pt will report a decrease in at worst pain level to 4/10 in the right knee with daily activities to improve pt's tolerance to  tasks. Pain 0/10 recently, but with feelings of instability  4. Pt will report being able to ascend/descend a flight of stairs with minimal to no instability in the right knee to improve pt's confidence in her mobility at home.    No change to note    PLAN  [x]  Upgrade activities as tolerated     [x]  Continue plan of care  [x]  Update interventions per flow sheet       []  Discharge due to:_  []  Other:_      Srinivas Mike, PT 2/22/2018  5:28 PM    Future Appointments  Date Time Provider Melecio Arroyo   2/27/2018 5:00 PM [employer] [place] [facility]   3/1/2018 5:00 [facility]

## 2018-02-27 ENCOUNTER — HOSPITAL ENCOUNTER (OUTPATIENT)
Dept: PHYSICAL THERAPY | Age: 28
Discharge: HOME | End: 2018-02-27
Payer: COMMERCIAL

## 2018-02-27 PROCEDURE — 97110 THERAPEUTIC EXERCISES: CPT

## 2018-02-27 PROCEDURE — 97112 NEUROMUSCULAR REEDUCATION: CPT

## 2018-02-27 NOTE — PROGRESS NOTES
PT DAILY TREATMENT NOTE     Patient Name: Shanice Madrid  Date:2018  : 1990  [x]  Patient  Verified  Payor: BLUE CROSS / Plan: [address] [place] [address] / Product Type: PPO /    In time:509  Out time:546  Total Treatment Time (min): 37  Visit #: 6 of 10    Treatment Area: Pain in right knee [M25.561]  Chondromalacia, right knee [M94.261]  Other instability, right knee [M25.361]    SUBJECTIVE  Pain Level (0-10 scale): 0  Any medication changes, allergies to medications, adverse drug reactions, diagnosis change, or new procedure performed?: [x] No    [] Yes (see summary sheet for update)  Subjective functional status/changes:   [] No changes reported  \"I'm doing ok, no pain right now. \"    OBJECTIVE    Modality rationale: PD   Min Type Additional Details    [] Estim:  []Unatt       []IFC  []Premod                        []Other:  []w/ice   []w/heat  Position:  Location:    [] Estim: []Att    []TENS instruct  []NMES                    []Other:  []w/US   []w/ice   []w/heat  Position:  Location:    []  Traction: [] Cervical       []Lumbar                       [] Prone          []Supine                       []Intermittent   []Continuous Lbs:  [] before manual  [] after manual    []  Ultrasound: []Continuous   [] Pulsed                           []1MHz   []3MHz W/cm2:  Location:    []  Iontophoresis with dexamethasone         Location: [] Take home patch   [] In clinic    []  Ice     []  heat  []  Ice massage  []  Laser   []  Anodyne Position:  Location:    []  Laser with stim  []  Other:  Position:  Location:    []  Vasopneumatic Device Pressure:       [] lo [] med [] hi   Temperature: [] lo [] med [] hi   [] Skin assessment post-treatment:  []intact []redness- no adverse reaction    []redness  adverse reaction:       10 min Therapeutic Exercise:  [x] See flow sheet :   Rationale: increase ROM, increase strength and improve coordination to improve the patients ability to perform ADls.        32 min Neuromuscular Re-education:  [x]  See flow sheet :quad re-ed/balance training. Rationale: increase ROM, increase strength, improve coordination, improve balance and increase proprioception  to improve the patients ability to normalize gait & balance. With   [] TE   [] TA   [] neuro   [] other: Patient Education: [x] Review HEP    [] Progressed/Changed HEP based on:   [] positioning   [] body mechanics   [] transfers   [] heat/ice application    [] other:      Other Objective/Functional Measures:      Pain Level (0-10 scale) post treatment: 0    ASSESSMENT/Changes in Function: Ms. Luz Marina Willson did well with progression to dynamic warmup activities, pain remains 0/10. Discussed having the patient wear patellar stability brace. Plan to reassess nv    Patient will continue to benefit from skilled PT services to modify and progress therapeutic interventions, address functional mobility deficits, address ROM deficits, address strength deficits, analyze and address soft tissue restrictions, analyze and cue movement patterns, analyze and modify body mechanics/ergonomics, assess and modify postural abnormalities, address imbalance/dizziness and instruct in home and community integration to attain remaining goals. []  See Plan of Care  []  See progress note/recertification  []  See Discharge Summary         Progress towards goals / Updated goals:  Short Term Goals: To be accomplished in 2 weeks:  1. Pt will report compliance and independence to HEP to help the pt manage their pain and symptoms.                         not met per patient report due to her kids being sick. Therapist encourages patient to perform HEP when able (2/6/2018)             [address] be accomplished in 5 weeks:  1. Pt will increase FOTO score to 70 points to improve ability to perform ADLs. Progressing. 64 points 2/22/2018  2.  Pt will increase MMT right hip flex to 4+/5, ER/IR to 4/5, B hip EXT to 4-/5 to improve ability to tolerate prolonged walking. Assess at later visit  3. Pt will report a decrease in at worst pain level to 4/10 in the right knee with daily activities to improve pt's tolerance to  tasks. Pain 0/10 recently, but with feelings of instability  4. Pt will report being able to ascend/descend a flight of stairs with minimal to no instability in the right knee to improve pt's confidence in her mobility at home.                         No change to note    PLAN  []  Upgrade activities as tolerated     [x]  Continue plan of care  []  Update interventions per flow sheet       []  Discharge due to:_  []  Other:_      Irish Mendoza PT 2/27/2018  5:09 PM    Future Appointments  Date Time Provider Meelcio Arroyo   3/1/2018 5:00 PM Jos Julien [facility]

## 2018-03-01 ENCOUNTER — HOSPITAL ENCOUNTER (OUTPATIENT)
Dept: PHYSICAL THERAPY | Age: 28
Discharge: HOME | End: 2018-03-01
Payer: COMMERCIAL

## 2018-03-01 PROCEDURE — 97112 NEUROMUSCULAR REEDUCATION: CPT

## 2018-03-01 PROCEDURE — 97110 THERAPEUTIC EXERCISES: CPT

## 2018-03-01 NOTE — PROGRESS NOTES
PT DAILY TREATMENT NOTE     Patient Name: Pascual Lacy  Date:3/1/2018  : 1990  [x]  Patient  Verified  Payor: BHARAT [place] / Plan: [address] [place] [address] / Product Type: PPO /    In time:506  Out time:539  Total Treatment Time (min): 33  Visit #: 7 of 10    Treatment Area: Pain in right knee [M25.561]  Chondromalacia, right knee [M94.261]  Other instability, right knee [M25.361]    SUBJECTIVE  Pain Level (0-10 scale): 0  Any medication changes, allergies to medications, adverse drug reactions, diagnosis change, or new procedure performed?: [x] No    [] Yes (see summary sheet for update)  Subjective functional status/changes:   [] No changes reported  \"No pain right now. \"    OBJECTIVE    Modality rationale: patient declined   Min Type Additional Details    [] Estim:  []Unatt       []IFC  []Premod                        []Other:  []w/ice   []w/heat  Position:  Location:    [] Estim: []Att    []TENS instruct  []NMES                    []Other:  []w/US   []w/ice   []w/heat  Position:  Location:    []  Traction: [] Cervical       []Lumbar                       [] Prone          []Supine                       []Intermittent   []Continuous Lbs:  [] before manual  [] after manual    []  Ultrasound: []Continuous   [] Pulsed                           []1MHz   []3MHz W/cm2:  Location:    []  Iontophoresis with dexamethasone         Location: [] Take home patch   [] In clinic    []  Ice     []  heat  []  Ice massage  []  Laser   []  Anodyne Position:  Location:    []  Laser with stim  []  Other:  Position:  Location:    []  Vasopneumatic Device Pressure:       [] lo [] med [] hi   Temperature: [] lo [] med [] hi   [] Skin assessment post-treatment:  []intact []redness- no adverse reaction    []redness  adverse reaction:     10 min Therapeutic Exercise:  [x] See flow sheet :   Rationale: increase ROM and increase strength to improve the patients ability to perform ADLs    23 min Neuromuscular Re-education:  [x]  See flow sheet : quad re-ed activities    Rationale: increase ROM, increase strength, improve coordination, improve balance and increase proprioception  to improve the patients ability to improve mobility, stance stability, and gait        With   [x] TE   [] TA   [x] neuro   [] other: Patient Education: [x] Review HEP    [] Progressed/Changed HEP based on:   [x] positioning   [x] body mechanics   [] transfers   [] heat/ice application    [] other:      Other Objective/Functional Measures:   FOTO 66     Pain Level (0-10 scale) post treatment: 0    ASSESSMENT/Changes in Function: Ms. Tisha Parham has been a pleasure to treat and reports 20% improvement since beginning therapy. She reports improvements with stability and ease with pain, but continues to have inconsistent pain especially at nights. She reports continued difficulty with squatting and stair negotiation. We are discharging to an updated HEP at this time as patient wishes to try returning to the gym and trying her HEP for a while and if functional deficits don't change she will follow up with MD.     []  See Plan of Care  []  See progress note/recertification  [x]  See Discharge Summary         Progress towards goals / Updated goals:  Short Term Goals: To be accomplished in 2 weeks:  1. Pt will report compliance and independence to HEP to help the pt manage their pain and symptoms.    NOT MET; reports inconsistent compliance             Long Term Goals: To be accomplished in 5 weeks:  1. Pt will increase FOTO score to 70 points to improve ability to perform ADLs. NOT MET; 66  2. Pt will increase MMT right hip flex to 4+/5, ER/IR to 4/5, B hip EXT to 4-/5 to improve ability to tolerate prolonged walking. MET; 5/5  3. Pt will report a decrease in at worst pain level to 4/10 in the right knee with daily activities to improve pt's tolerance to  tasks. NOT MET; at worst 5-6/10  4.  Pt will report being able to ascend/descend a flight of stairs with minimal to no instability in the right knee to improve pt's confidence in her mobility at home.    NOT MET; Reports continued difficulty with stair negotiation    Functional Gains: stability, ease with pain   Functional Deficits: squatting, stair negotiation, pain, some stability issues, lifting heavier objects, pain at night  % improvement: 20%  Pain   Average: 3-4/10       Best: 0/10     Worst: 5-6/10  Patient Goal: \"to have better stability and strength\"    PLAN  []  Upgrade activities as tolerated     []  Continue plan of care  []  Update interventions per flow sheet       [x]  Discharge due to: SELF  []  Other:_      Jackie Gamble, PTA, CSCS 3/1/2018  5:42 PM    Future Appointments  Date Time Provider eMlecio Arroyo   3/7/2018 5:00 PM [facility]   3/9/2018 5:00 PM Kiana Osuna, [facility]

## 2018-03-02 NOTE — PROGRESS NOTES
[facility] [address]  [address] [facility] [address], [address] [phone] fax    Discharge Summary  Patient name: Lj Muir Start of Care: 2018   Referral source: Yolanda Mason MD : 1990                         Medical Diagnosis: Pain in right knee [M25.561]  Chondromalacia, right knee [M94.261]  Other instability, right knee [M25.361] Onset Date: initial ~10-15 years ago, most recent roxanna the past year                         Treatment Diagnosis: right knee pain and instability   Prior Hospitalization: see medical history Provider#: [id number]   Medications: Verified on Patient summary List    Comorbidities: had positive PPD in ~ but is negative currently for TB, hx pregnancies   Prior Level of Function: Independent with ADLs, functional, and work tasks with intermittent pain in the right knee  Visits from Start of Care: 7    Missed Visits: 2  Reporting Period : 2018 to 3/1/2018    Goal:Pt will report compliance and independence to [facility] to help the pt manage their pain and symptoms.   Status at last note/certification:NOT MET; reports inconsistent compliance   Status at discharge: not met    Goal:Pt will increase FOTO score to 70 points to improve ability to perform ADLs. Status at last note/certification:NOT MET; 66  Status at discharge: not met    Goal:Pt will increase MMT right hip flex to 4+/5, ER/IR to 4/5, B hip EXT to 4-/5 to improve ability to tolerate prolonged walking. Status at last note/certification: MET 5/5  Status at discharge: met    Goal:Pt will report a decrease in at worst pain level to 4/10 in the right knee with daily activities to improve pt's tolerance to  tasks.    Status at last note/certification:NOT MET; at worst 5-6/10  Status at discharge: not met    Goal:Pt will report being able to ascend/descend a flight of stairs with minimal to no instability in the right knee to improve pt's confidence in her mobility at home. Status at last note/certification:NOT MET; Reports continued difficulty with stair negotiation  Status at discharge: not met    Functional Gains: stability, ease with pain   Functional Deficits: squatting, stair negotiation, pain, some stability issues, lifting heavier objects, pain at night  % improvement: 20%  Pain   Average: 3-4/10                            Best: 0/10                          Worst: 5-6/10  Patient Goal: \"to have better stability and strength\"    Assessment/Summary of care:   Pt was seen for 7 therapy sessions. Pt demonstrated/reported improvements in stability and ease of pain since starting therapy. Pt reports 20% improvement since start of care. Pt states she continues to have inconsistent pain in the right knee especially at nights. She reports having continued difficulty with squatting and stair negotiation. We will d/c pt to an updated HEP at this time as the pt wishes to try return to the gym along with performing her HEP for a while. Pt states she will plan on following up with the MD if her functional deficits do not change. Pt is therefore d/c'ed from therapy at this time to HEP.     RECOMMENDATIONS:  [x]Discontinue therapy: []Patient has reached or is progressing toward set goals      []Patient is non-compliant or has abdicated      [x]Due to lack of appreciable progress towards set goals    Lina Ritter, PT 3/2/2018 12:43 PM

## 2020-04-07 NOTE — PROGRESS NOTES
PT DAILY TREATMENT NOTE     Patient Name: Philis Homans  Date:2018  : 1990  [x]  Patient  Verified  Payor: Juan Andersen / Plan: [address] [place] [address] / Product Type: PPO /    In time:500  Out time:523  Total Treatment Time (min): 23  Visit #: 3 of 10    Treatment Area: Pain in right knee [M25.561]  Chondromalacia, right knee [M94.261]  Other instability, right knee [M25.361]    SUBJECTIVE  Pain Level (0-10 scale): 0  Any medication changes, allergies to medications, adverse drug reactions, diagnosis change, or new procedure performed?: [x] No    [] Yes (see summary sheet for update)  Subjective functional status/changes:   [] No changes reported  \"No pain. My knee feels unstable at times. \"    OBJECTIVE    Modality rationale: patient declined   Min Type Additional Details    [] Estim:  []Unatt       []IFC  []Premod                        []Other:  []w/ice   []w/heat  Position:  Location:    [] Estim: []Att    []TENS instruct  []NMES                    []Other:  []w/US   []w/ice   []w/heat  Position:  Location:    []  Traction: [] Cervical       []Lumbar                       [] Prone          []Supine                       []Intermittent   []Continuous Lbs:  [] before manual  [] after manual    []  Ultrasound: []Continuous   [] Pulsed                           []1MHz   []3MHz W/cm2:  Location:    []  Iontophoresis with dexamethasone         Location: [] Take home patch   [] In clinic    []  Ice     []  heat  []  Ice massage  []  Laser   []  Anodyne Position:  Location:    []  Laser with stim  []  Other:  Position:  Location:    []  Vasopneumatic Device Pressure:       [] lo [] med [] hi   Temperature: [] lo [] med [] hi   [] Skin assessment post-treatment:  []intact []redness- no adverse reaction    []redness  adverse reaction:     10 min Therapeutic Exercise:  [x] See flow sheet :   Rationale: increase ROM and increase strength to improve the patients ability to perform ADLs    13 min Neuromuscular Re-education:  [x]  See flow sheet : quad re-ed activities   Rationale: increase ROM, increase strength, improve coordination, improve balance and increase proprioception  to improve the patients ability to improve mobility, stance stability, and gait        With   [x] TE   [] TA   [x] neuro   [] other: Patient Education: [x] Review HEP    [] Progressed/Changed HEP based on:   [x] positioning   [x] body mechanics   [] transfers   [] heat/ice application    [] other:      Other Objective/Functional Measures:      Pain Level (0-10 scale) post treatment: 0    ASSESSMENT/Changes in Function: pt reports fatigued following previous visit. Crepitus noted throughout exercises. Needed cuing to prevent anterior weight shifting with lunging and side lunging. Patient will continue to benefit from skilled PT services to modify and progress therapeutic interventions, address functional mobility deficits, address ROM deficits, address strength deficits, analyze and address soft tissue restrictions, analyze and cue movement patterns, analyze and modify body mechanics/ergonomics, assess and modify postural abnormalities, address imbalance/dizziness and instruct in home and community integration to attain remaining goals. [x]  See Plan of Care  []  See progress note/recertification  []  See Discharge Summary         Progress towards goals / Updated goals:  Short Term Goals: To be accomplished in 2 weeks:  1. Pt will report compliance and independence to HEP to help the pt manage their pain and symptoms.    not met per patient report due to her kids being sick. Therapist encourages patient to perform HEP when able (2/6/2018)             [address] be accomplished in 5 weeks:  1. Pt will increase FOTO score to 70 points to improve ability to perform ADLs. . Assess at 5th visit  2.  Pt will increase MMT right hip flex to 4+/5, ER/IR to 4/5, B hip EXT to 4-/5 to improve ability to tolerate prolonged walking. Assess at later visit  3. Pt will report a decrease in at worst pain level to 4/10 in the right knee with daily activities to improve pt's tolerance to  tasks. Pain 0/10 recently, but with feelings of instability  4. Pt will report being able to ascend/descend a flight of stairs with minimal to no instability in the right knee to improve pt's confidence in her mobility at home.    No change to note    PLAN  []  Upgrade activities as tolerated     [x]  Continue plan of care  []  Update interventions per flow sheet       []  Discharge due to:_  []  Other:_      Fausto Martinez PTA, CSCS 2/14/2018  5:32 PM    Future Appointments  Date Time Provider Melecio Arroyo   2/16/2018 5:00 PM Fausto Martinez PTA [facility] No